# Patient Record
Sex: FEMALE | Race: WHITE
[De-identification: names, ages, dates, MRNs, and addresses within clinical notes are randomized per-mention and may not be internally consistent; named-entity substitution may affect disease eponyms.]

---

## 2019-04-04 ENCOUNTER — HOSPITAL ENCOUNTER (OUTPATIENT)
Dept: HOSPITAL 91 - SDS | Age: 29
Discharge: HOME | End: 2019-04-04
Payer: COMMERCIAL

## 2019-04-04 ENCOUNTER — HOSPITAL ENCOUNTER (OUTPATIENT)
Dept: HOSPITAL 10 - SDS | Age: 29
Discharge: HOME | End: 2019-04-04
Payer: COMMERCIAL

## 2019-04-04 VITALS — DIASTOLIC BLOOD PRESSURE: 86 MMHG | RESPIRATION RATE: 19 BRPM | HEART RATE: 69 BPM | SYSTOLIC BLOOD PRESSURE: 132 MMHG

## 2019-04-04 VITALS — SYSTOLIC BLOOD PRESSURE: 119 MMHG | HEART RATE: 64 BPM | RESPIRATION RATE: 18 BRPM | DIASTOLIC BLOOD PRESSURE: 83 MMHG

## 2019-04-04 VITALS — HEART RATE: 57 BPM | SYSTOLIC BLOOD PRESSURE: 111 MMHG | DIASTOLIC BLOOD PRESSURE: 57 MMHG | RESPIRATION RATE: 16 BRPM

## 2019-04-04 VITALS
WEIGHT: 141.1 LBS | BODY MASS INDEX: 22.15 KG/M2 | WEIGHT: 141.1 LBS | HEIGHT: 67 IN | BODY MASS INDEX: 22.15 KG/M2 | HEIGHT: 67 IN

## 2019-04-04 VITALS — SYSTOLIC BLOOD PRESSURE: 114 MMHG | RESPIRATION RATE: 17 BRPM | DIASTOLIC BLOOD PRESSURE: 78 MMHG | HEART RATE: 64 BPM

## 2019-04-04 VITALS — DIASTOLIC BLOOD PRESSURE: 77 MMHG | RESPIRATION RATE: 19 BRPM | HEART RATE: 60 BPM | SYSTOLIC BLOOD PRESSURE: 114 MMHG

## 2019-04-04 VITALS — RESPIRATION RATE: 19 BRPM | SYSTOLIC BLOOD PRESSURE: 118 MMHG | DIASTOLIC BLOOD PRESSURE: 77 MMHG | HEART RATE: 76 BPM

## 2019-04-04 VITALS — SYSTOLIC BLOOD PRESSURE: 112 MMHG | DIASTOLIC BLOOD PRESSURE: 83 MMHG | HEART RATE: 62 BPM | RESPIRATION RATE: 18 BRPM

## 2019-04-04 VITALS — HEART RATE: 60 BPM | RESPIRATION RATE: 21 BRPM | SYSTOLIC BLOOD PRESSURE: 118 MMHG | DIASTOLIC BLOOD PRESSURE: 83 MMHG

## 2019-04-04 VITALS — HEART RATE: 68 BPM | RESPIRATION RATE: 20 BRPM | SYSTOLIC BLOOD PRESSURE: 127 MMHG | DIASTOLIC BLOOD PRESSURE: 75 MMHG

## 2019-04-04 VITALS — HEART RATE: 64 BPM | SYSTOLIC BLOOD PRESSURE: 140 MMHG | DIASTOLIC BLOOD PRESSURE: 89 MMHG | RESPIRATION RATE: 25 BRPM

## 2019-04-04 DIAGNOSIS — M95.0: ICD-10-CM

## 2019-04-04 DIAGNOSIS — J34.2: ICD-10-CM

## 2019-04-04 DIAGNOSIS — J35.3: Primary | ICD-10-CM

## 2019-04-04 PROCEDURE — 30140 RESECT INFERIOR TURBINATE: CPT

## 2019-04-04 PROCEDURE — 88300 SURGICAL PATH GROSS: CPT

## 2019-04-04 PROCEDURE — 30520 REPAIR OF NASAL SEPTUM: CPT

## 2019-04-04 RX ADMIN — OXYMETAZOLINE HYDROCHLORIDE 1 SPRAY: 5 SPRAY NASAL at 13:01

## 2019-04-04 RX ADMIN — BACITRACIN ZINC, AND POLYMYXIN B SULFATE 1 APPLIC: 500; 10000 OINTMENT TOPICAL at 12:59

## 2019-04-04 RX ADMIN — LIDOCAINE HYDROCHLORIDE,EPINEPHRINE BITARTRATE 1 ML: 10; .01 INJECTION, SOLUTION INFILTRATION; PERINEURAL at 13:00

## 2019-04-04 RX ADMIN — HYDROMORPHONE HYDROCHLORIDE 1 MG: 2 INJECTION INTRAMUSCULAR; INTRAVENOUS; SUBCUTANEOUS at 14:25

## 2019-04-04 RX ADMIN — DIPHENHYDRAMINE HYDROCHLORIDE 1 MG: 50 INJECTION, SOLUTION INTRAMUSCULAR; INTRAVENOUS at 14:46

## 2019-04-04 NOTE — HPN
Date/Time of Note


Date/Time of Note


DATE: 4/4/19 


TIME: 10:54





Interval H&P Admission Note


Pt. seen H&P reviewed:  No system changes











CONTRERAS DUARTE MD                   Apr 4, 2019 10:54

## 2019-04-04 NOTE — SIPON
Date/Time of Note


Date/Time of Note


DATE: 4/4/19 


TIME: 14:10





Operative Report


Preoperative Diagnosis


1. Deviated septum; 2. Turbinate hypertrophy; 3. Nasal valve blockage


Postoperative Diagnosis


same


Operation/Procedure Performed


1. Nasal valve blockage; 2. Septoplasty; 3. Turbinate reduction; 4. [place] of 


septal cartilage


Surgeon


see signature line


First assist


None


Anesthesia:  general


Estimated blood loss:  minimal


Transfusion Required


   none


Specimen


septum


Grafts/Implants


none


Complications


none











CONTRERAS DUARTE MD                   Apr 4, 2019 14:15

## 2019-04-04 NOTE — PREAC
Date/Time of Note


Date/Time of Note


DATE: 4/4/19 


TIME: 11:35





Anesthesia Eval and Record


Evaluation


Time Pre-Procedure Interview


DATE: 4/4/19 


TIME: 11:35


Age


28


Sex


female


NPO:  8 hrs


Preoperative diagnosis


septal deviation


Planned procedure


septoplasty, nasal turbinate reduction





Past Medical History


Past Medical History:  Includes


Recreational drugs:  Marijuana (daily recreational smoker, last smoked marijuana


yesterday)





Surgery & Anesthesia Issues


No known issue (hx left thumb surgery)





Meds


Anticoagulation:  Yes


Beta Blocker within 24 hr:  No


Reason Beta Blocker not given:  Pt. not on B-Blocker


Discontinued Scripts


Acetaminophen-Codeine* (Acetaminophen-Cod #3*) 300-30 Mg Tab, 1 TAB PO Q4H PRN 


for PAIN, #14 TAB


   Prov:MACO CASH MD         4/29/16


Ibuprofen* (Motrin*) 600 Mg Tab, 600 MG PO Q6, #14 TAB


   Prov:MACO CASH MD         4/29/16


Amox Tr/Potassium Clavulanate (Amox Tr-K Clv 875-125 Mg Tab) 1 Tab Tablet, 1 TAB


PO BID for 7 Days, TAB


   Prov:MACO CASH MD         4/29/16





Current Medications


Cefazolin Sodium 50 ml @  100 mls/hr PREOP IVPB ;  Start 4/4/19 at 06:30;  Stop 


4/4/19 at 18:00


Lactated Ringer's 1,000 ml @  30 mls/hr Q24H IV ;  Start 4/4/19 at 11:30


Meds reviewed:  Yes





Allergies


Coded Allergies:  


     Iodine and Iodide Containing Produc (Verified  Allergy, Unknown, 4/4/19)


Allergies Reviewed:  Yes





Labs/Studies


Labs Reviewed:  Reviewed by anesthesiologist


Pregnancy test:  Negative





Pre-procedure Exam


Last vitals





Vital Signs


  Date      Temp  Pulse  Resp  B/P (MAP)   Pulse Ox  O2          O2 Flow    FiO2


Time                                                 Delivery    Rate


    4/4/19  97.4     57    16      111/57        97  Room Air


     10:52                           (75)





Airway:  Adequate mouth opening, Adequate thyromental dist


Mallampati:  Mallampati II


Teeth:  Normal


Lung:  Normal


Heart:  Normal





ASA Physical Status


ASA physical status:  1


Emergency:  None





Planned Anesthetic


General/MAC:  ETT





Planned Pain Management


Parenteral pain med, Local by surgeon





Pre-operative Attestations


Prior to commencing anesthesia and surgery, the patient was re-evaluated, there 


was verification of:


*The patient's identity


*The results of appropriate recent lab work and preoperative vital signs


*The above evaluation not changing prior to induction


*Anesthetic plan, risk benefits, alternative and complications discussed with 


patient/family; questions answered; patient/family understands, accepts and 


wishes to proceed.











SAL WALLS                   Apr 4, 2019 11:36

## 2019-04-04 NOTE — OPR
Date/Time of Note


Date/Time of Note


DATE: 4/4/19 


TIME: 14:15





Operative Report


Procedure Date:  Apr 4, 2019


Preoperative Diagnosis


1. Deviated septum; 2. Turbinate hypertrophy; 3. Nasal valve blockage


Postoperative Diagnosis


same


Operation/Procedure Performed


1. Septoplasty; 2. Turbinate reduction; 3. Nasal valve repair; 4. [place] of 


septal cartilage


Surgeon


see signature line


Assistant


none


Anesthesia Type:  general


Estimated Blood Loss:  minimal


Transfusion


   none


Specimen


septum


Grafts/Implants


none


Complications


none


Pt Condition Post Procedure:  stable


Disposition:  PACU


Indications


The patient is a 28-year-old female with a long-standing history of nasal airway


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  The risks, benefits


and alternatives of surgery were discussed with the patient which include but 


not limited to bleeding infection, pain, scar, septal perforation, no impr


ovement in symptoms, change in aesthetic of the nose, and need for revision 


surgery.  She understood these and signed consent.


Procedure Description


After informed consent was obtained, the patient was brought back to operating 


room suite.  She was intubated by anesthesia and sedated.  Her eyes were 


protected and a head drape was placed.  The nose was prepped and draped in usual


sterile fashion.  [place]-Synephrine soaked cottonoids were inserted to nasal 


cavities and left for several minutes and then removed.  1% lidocaine with 


epinephrine was injected into the nasal septum and inferior turbinates.  A 15 


blade was used to make a left hemitransfixion incision.  Bilateral 


mucoperichondrial flaps were elevated.  Once the flaps were elevated, the septum


was evaluated.  There was an acute fracture of the inferior septum towards the 


right side.  In addition, the caudal septum was extremely twisted.  I used a 


[place] elevator to disarticulate the bony cartilaginous junction.  Deviated 


portions of the vomer bone were removed with Negro forceps.  I then 


disarticulated the quadrangular cartilage from the maxillary crest.  I then 


harvested a large piece of cartilage from the central portion of the 


quadrangular cartilage.  I then used this cartilage to reinforce a new L strut. 


The native L strut was weakened by scoring the cartilage.  I then used the 


harvested cartilage and mattressed it to the native L strut with mattressed 4-0 


PDS suture.  This allowed me to improve both the airway at the nasal valve 


region as well as at the caudal septum region.  The L strut was then secured to 


the anterior nasal spine with a 4-0 PDS suture.  The flap was then laid back 


down and closed with interrupted 4-0 chromic suture.





A stab incision was made under the heads of both inferior turbinates.  


Mucoperiosteal flaps were elevated.  Inferior turbinate bone was removed with 


Negro forceps.  Bipolar cautery was applied were needed.  Silastic [place] 


splints coated in antibiotic ointment were placed bilaterally and secured with a


3-0 Prolene suture.  The stomach was then suctioned.  The patient was extubated 


by anesthesia and brought to the recovery room in stable condition.











CONTRERAS DUARTE MD                   Apr 4, 2019 14:32

## 2019-04-04 NOTE — PAC
Date/Time of Note


Date/Time of Note


DATE: 4/4/19 


TIME: 15:07





Post-Anesthesia Notes


Post-Anesthesia Note


Last documented vital signs





Vital Signs


  Date      Temp  Pulse  Resp  B/P (MAP)   Pulse Ox  O2          O2 Flow    FiO2


Time                                                 Delivery    Rate


    4/4/19           60    21      118/83        97  Room Air


     14:55                           (95)


    4/4/19  98.0


     14:16





Activity:  WNL


Respiratory function:  WNL


Cardiovascular function:  WNL


Mental status:  Baseline


Pain reasonably controlled:  Yes


Hydration appropriate:  Yes


Nausea/Vomiting absent:  Yes


Comments


BT: 98.8











MIESHA PULIDO MD                Apr 4, 2019 15:07